# Patient Record
Sex: FEMALE | Race: WHITE | Employment: PART TIME | URBAN - METROPOLITAN AREA
[De-identification: names, ages, dates, MRNs, and addresses within clinical notes are randomized per-mention and may not be internally consistent; named-entity substitution may affect disease eponyms.]

---

## 2017-12-18 PROBLEM — R87.810 PAPANICOLAOU SMEAR OF CERVIX WITH POSITIVE HIGH RISK HUMAN PAPILLOMA VIRUS (HPV) TEST: Status: ACTIVE | Noted: 2017-12-18

## 2018-01-08 ENCOUNTER — HOSPITAL ENCOUNTER (OUTPATIENT)
Dept: MAMMOGRAPHY | Age: 43
Discharge: HOME | End: 2018-01-08
Attending: NURSE PRACTITIONER
Payer: OTHER GOVERNMENT

## 2018-01-08 DIAGNOSIS — Z01.419 WELL WOMAN EXAM: ICD-10-CM

## 2018-01-08 DIAGNOSIS — Z12.31 SCREENING MAMMOGRAM, ENCOUNTER FOR: ICD-10-CM

## 2018-01-08 PROCEDURE — 77067 SCR MAMMO BI INCL CAD: CPT

## 2019-01-18 ENCOUNTER — HOSPITAL ENCOUNTER (OUTPATIENT)
Dept: MAMMOGRAPHY | Age: 44
Discharge: HOME | End: 2019-01-18
Attending: OBSTETRICS & GYNECOLOGY
Payer: OTHER GOVERNMENT

## 2019-01-18 DIAGNOSIS — Z12.39 SCREENING BREAST EXAMINATION: ICD-10-CM

## 2019-01-18 PROCEDURE — 77067 SCR MAMMO BI INCL CAD: CPT

## 2019-03-18 ENCOUNTER — APPOINTMENT (RX ONLY)
Dept: URBAN - METROPOLITAN AREA CLINIC 23 | Facility: CLINIC | Age: 44
Setting detail: DERMATOLOGY
End: 2019-03-18

## 2019-03-18 DIAGNOSIS — Z71.89 OTHER SPECIFIED COUNSELING: ICD-10-CM

## 2019-03-18 DIAGNOSIS — L73.8 OTHER SPECIFIED FOLLICULAR DISORDERS: ICD-10-CM

## 2019-03-18 DIAGNOSIS — L57.8 OTHER SKIN CHANGES DUE TO CHRONIC EXPOSURE TO NONIONIZING RADIATION: ICD-10-CM

## 2019-03-18 PROBLEM — L20.84 INTRINSIC (ALLERGIC) ECZEMA: Status: ACTIVE | Noted: 2019-03-18

## 2019-03-18 PROBLEM — J30.1 ALLERGIC RHINITIS DUE TO POLLEN: Status: ACTIVE | Noted: 2019-03-18

## 2019-03-18 PROBLEM — L85.3 XEROSIS CUTIS: Status: ACTIVE | Noted: 2019-03-18

## 2019-03-18 PROBLEM — D48.5 NEOPLASM OF UNCERTAIN BEHAVIOR OF SKIN: Status: ACTIVE | Noted: 2019-03-18

## 2019-03-18 PROCEDURE — 99242 OFF/OP CONSLTJ NEW/EST SF 20: CPT

## 2019-03-18 PROCEDURE — ? COUNSELING

## 2019-03-18 ASSESSMENT — LOCATION DETAILED DESCRIPTION DERM
LOCATION DETAILED: RIGHT INFERIOR ANTERIOR NECK
LOCATION DETAILED: RIGHT INFERIOR CENTRAL MALAR CHEEK
LOCATION DETAILED: PERIANAL SKIN

## 2019-03-18 ASSESSMENT — LOCATION ZONE DERM
LOCATION ZONE: NECK
LOCATION ZONE: ANUS
LOCATION ZONE: FACE

## 2019-03-18 ASSESSMENT — LOCATION SIMPLE DESCRIPTION DERM
LOCATION SIMPLE: PERIANAL SKIN
LOCATION SIMPLE: RIGHT CHEEK
LOCATION SIMPLE: RIGHT ANTERIOR NECK

## 2019-03-18 NOTE — PROCEDURE: COUNSELING
Patient Specific Counseling (Will Not Stick From Patient To Patient): Consider evaluation by GI
Detail Level: Detailed

## 2020-03-03 ENCOUNTER — HOSPITAL ENCOUNTER (OUTPATIENT)
Dept: MAMMOGRAPHY | Age: 45
Discharge: HOME | End: 2020-03-03
Attending: FAMILY MEDICINE
Payer: OTHER GOVERNMENT

## 2020-03-03 DIAGNOSIS — Z12.31 VISIT FOR SCREENING MAMMOGRAM: ICD-10-CM

## 2020-03-03 PROCEDURE — 77063 BREAST TOMOSYNTHESIS BI: CPT

## 2020-03-04 NOTE — THERAPY EVALUATION
Mer Mckeon  : 1975  [facility]  Secondary:  [facility] [place] [address]. [address], [address]  [phone]   [phone]      OUTPATIENT PHYSICAL THERAPY:Initial Evaluation3/2020    ICD-10: Treatment Diagnosis:   Cervicalgia (M54.2)  Headache (R51)  Muscle Weakness (generalized) (M62.81)  Pain in Left Shoulder (M25.512)  Other muscle spasm (M06.110)  Precautions/Allergies:   Brethine [terbutaline] and Other plant, animal, environmental   Fall Risk Score: 0 (? 5 = High Risk)  MD Orders: Eval and Treat MEDICAL/REFERRING DIAGNOSIS:  Neck pain on left side [M54.2]   DATE OF ONSET: 8-9 years ago  REFERRING PHYSICIAN: Makayla Pichardo*  RETURN PHYSICIAN APPOINTMENT: TBD by patient      INITIAL ASSESSMENT:  Ms. Mer Mckeon has attended 1 physical therapy session including initial evaluation. Mer Mckeon presents with raised 1st rib on L side, trigger points throughout L upper trap and cervical musculature on the L, hypomobile lower cervical spine and overall S/S of increased pain, decreased ROM, decreased strength, decreased functional tolerance consistent with S/S of cervicalgia, muscle spasm muscle weakness, and L shoulder pain and headaches. Mer Mckeon will benefit from home exercise program, therapeutic and postural strengthening exercises, manual therapeutic techniques (ie. Distraction, SOR, myofascial release/soft tissue mobilization) as appropriate to address Deshawn Chambers's current condition. Mer Mckeon will benefit from skilled PT (medically necessary) to address above deficits affecting participation in basic ADLs and overall functional tolerance. PROBLEM LIST (Impacting functional limitations):  1. Decreased Strength  2. Decreased ADL/Functional Activities  3. Decreased Transfer Abilities  4. Increased Pain  5.  Decreased Activity Tolerance  6. Increased Fatigue  7. Increased Shortness of Breath  8. Decreased Flexibility/Joint Mobility  9. Decreased [place] with Home Exercise Program INTERVENTIONS PLANNED:  1. Balance Exercise  2. Bed Mobility  3. Cold  4. Cryotherapy  5. Family Education  6. Gait Training  7. Heat  8. Home Exercise Program (HEP)  9. Manual Therapy  10. Neuromuscular Re-education/Strengthening  11. Range of Motion (ROM)  12. Therapeutic Activites  13. Therapeutic Exercise/Strengthening  14. Transfer Training   TREATMENT PLAN:  Effective Dates: 3/5/2020 TO 6/3/2020 (90 days). Frequency/Duration: 1-2 time a week for 90 Days  GOALS: (Goals have been discussed and agreed upon with patient.)     Short-Term Goals~4 weeks  Goal Met   1. Marcos Causey will report <=3/10 pain to cervical spine with sitting at her desk during work  1. [] Date:   2. Marcos Causey will demonstrate improved NDI score, indicating spinal improvement from 8/50 to 2/50 affecting minimal to no difficulty with performance of cervical mobility and strengthening. 2.  [] Date:   3. Marcos Cue to be independent with initial HEP for cervical region and UE's. 3.  [] Date:   4. Marcos Causey will improve cervical L SB ROM to >=50 degrees to assist with improved function during instrumental activities of daily living. 4.  [] Date:   5.  5.  [] Date:     6.  [] Date:         Long Term Goals~8 weeks Goal Met   1. Marcos Causey will report <=1-2/10 pain to cervical spine with performance of functional spinal mobility and rotation and minimal to no difficulty with such tasks. 1.  [] Date:   2. Marcos Causey will report min to no pain with her exercise videos to improve health and quality of life 2. [] Date:   3. Marcos Causey to be independent with advanced HEP for cervical region and UE's. 3.  [] Date:               Outcome Measure:    Tool Used: Neck Disability Index (NDI)  Score:  Initial: 8/50  Most Recent: X/50 (Date: -- )   Interpretation of Score: The Neck Disability Index is a revised form of the Oswestry Low Back Pain Index and is designed to measure the activities of daily living in person's with neck pain. Each section is scored on a 0-5 scale, 5 representing the greatest disability. The scores of each section are added together for a total score of 50. Medical Necessity:   · Skilled intervention continues to be required due to address above deficits affecting participation in basic ADLs and overall functional tolerance. Reason for Services/Other Comments:  · Patient continues to require skilled intervention due to address above deficits affecting participation in basic ADLs and overall functional tolerance. Total Treatment Duration:10 eval, 20 manual, 15 therex  PT Patient Time In/Time Out  Time In: 1500  Time Out: 1545      Rehabilitation Potential For Stated Goals: [facility] therapy, I certify that the treatment plan above will be carried out by a therapist or under their direction. Thank you for this referral,  Obed Mazariegos     Referring Physician Signature: Juliann Le*              Date                    HISTORY:   History of Present Injury/Illness (Reason for Referral  Ronald Sepulveda states she has had L sided neck pain for about 8-9 years due to carrying stress in her shoulders. She states it doesn't bother her daily, but more so when she is stressed out. She states she stopped working out about 3 weeks ago and took a rest due to pain with lifting on L elbow/arm.  She states she was doing RUBEN and has recently started back working out with the program.     Location:   Pain 1(P1): L superior shoulder/neck and elbow   Pain 2(P2):     Pain Severity:  Current:0/10  Best: 0/10  Worst: 7/10    · Aggravating factors: Lifting and flexing or stretching, and stress  · Relieving factors: massage and not being stressed  · Irritability: Low (Onset of pain is is longer than the time it takes for Pain to go away)      Past Medical History/Comorbidities:   Ms. Pattie Gold  has a past medical history of ASCUS with positive high risk HPV cervical (06/20/2018), Hepatitis A, HPV (human papilloma virus) infection (2017, 2018 and 2019), and Miscarriage. Ms. Pattie Gold  has a past surgical history that includes hx dilation and curettage; hx abdominal laparoscopy; hx colposcopy (02/18/2017); and hx wisdom teeth extraction (1994). Active Ambulatory Problems     Diagnosis Date Noted    Papanicolaou smear of cervix with positive high risk human papilloma virus (HPV) test 12/18/2017     Resolved Ambulatory Problems     Diagnosis Date Noted    No Resolved Ambulatory Problems     Past Medical History:   Diagnosis Date    ASCUS with positive high risk HPV cervical 06/20/2018    Hepatitis A     HPV (human papilloma virus) infection 2017, 2018 and 2019    Miscarriage      Social History/Living Environment:        Social History     Socioeconomic History    Marital status:      Spouse name: Not on file    Number of children: Not on file    Years of education: Not on file    Highest education level: Not on file   Occupational History    Not on file   Social Needs    Financial resource strain: Not on file    Food insecurity:     Worry: Not on file     Inability: Not on file    Transportation needs:     Medical: Not on file     Non-medical: Not on file   Tobacco Use    Smoking status: Never Smoker    Smokeless tobacco: Never Used   Substance and Sexual Activity    Alcohol use:  Yes     Alcohol/week: 1.0 standard drinks     Types: 1 Glasses of wine per week     Comment: OCC    Drug use: No    Sexual activity: Yes     Partners: Male     Birth control/protection: Inserts     Comment: NuvaRing   Lifestyle    Physical activity:     Days per week: Not on file     Minutes per session: Not on file    Stress: Not on file   Relationships    Social connections:     Talks on phone: Not on file     Gets together: Not on file     Attends Yazdanism service: Not on file     Active member of club or organization: Not on file     Attends meetings of clubs or organizations: Not on file     Relationship status: Not on file    Intimate partner violence:     Fear of current or ex partner: Not on file     Emotionally abused: Not on file     Physically abused: Not on file     Forced sexual activity: Not on file   Other Topics Concern    Not on file   Social History Narrative    Not on file      Prior Level of Function/Work/Activity:  Unrestricted    Other Clinical Tests:          none    Previous Treatment Approaches:          Massage  Current Medications:    Current Outpatient Medications:     ethinyl estradiol-etonogestrel (NUVARING) 0.12-0.015 mg/24 hr vaginal ring, Insert 1 ring per vagina and leave in place for 3 weeks. Remove for 1 week., Disp: 3 Each, Rfl: 4    montelukast (SINGULAIR) 10 mg tablet, Take 1 Tab by mouth daily. , Disp: 30 Tab, Rfl: 11    FEXOFENADINE HCL (ALLEGRA PO), Take  by mouth., Disp: , Rfl:         Ambulatory/Rehab Services H2 Model Falls Risk Assessment    Risk Factors:       No Risk Factors Identified Ability to Rise from Chair:       (0)  Ability to rise in a single movement    Falls Prevention Plan:       No modifications necessary   Total: (5 or greater = High Risk): 0    ©2010 [facility] of [employer]. All Rights Reserved. [facility] Patent #[id number]. Federal Law prohibits the replication, distribution or use without written permission from [facility] [employer]         Date Last Reviewed:  3/5/2020     1-2: MODERATE COMPLEXITY   EXAMINATION:   Observation/Orthostatic Postural Assessment:           Forward head and L UT/shoulder raised significantly higher than R  Palpation:          TTP L UT and cervical/shoulder musculature  ROM:      AROM/PROM                  Joint: Eval Date:3/5/20  Re-Assess Date:  Re-Assess Date:    Active ROM           Cervical Extension 62          Cervical Flexion 75           RIGHT LEFT RIGHT LEFT RIGHT LEFT   Cervical Sidebending 56 45           Cervical Rotation WFL WFL                        Shoulder Flexion WFL            Shoulder Abduction WFL                         Lumbar Flexion             Lumber Extension                   Strength:     Eval Date:3/5/20  Re-Assess Date:  Re-Assess Date:      RIGHT LEFT RIGHT LEFT RIGHT LEFT   Shoulder Flexion  5/5  5/5           Shoulder Abduction (C5)  5/5  5/5           Shoulder Internal Rotation  5/5  5/5           Shoulder External Rotation  5/5  5/5           Elbow Flexion (C6)  5/5  5/5           Elbow Extension (C7)  5/5  5/5           Wrist Flexion (C7)  5/5  5/5           Wrist Extension (C6)  5/5  5/5           Resisted Thumb Extension/Finger Abduction (C8/T1) Bree     Normal           Resisted Cervical Rotation (C1):             Resisted Shoulder Shrug (C2, 3, 4): normal              Strength     [facility] Bowels                     Manual:  Joint Directon Grade Treatment Effect    PA III Improved Symptoms post treatment    PA III Improved Symptoms post treatment    Inferior Glide IV Improved Symptoms post treatment     Reflex Testing (Biceps, Brachioradialis, Triceps): Not tested          Special Tests:    Spurling's:Negative                Patient denies Dysarthria, , Diplopia, Drop attacks, , Dizziness or Dysphagia      Neurological Screen: Radiating symptoms: NO     Functional Mobility:  Limited     Balance:  Not Tested     Body Structures Involved:  1. Nerves  2. Joints  3. Muscles  4. Ligaments Body Functions Affected:  1. Sensory/Pain  2. Neuromusculoskeletal  3. Movement Related Activities and Participation Affected:  1. Mobility  2.  Self Care     Number of elements that affect the Plan of Care: 1-2: LOW COMPLEXITY   CLINICAL PRESENTATION:   Presentation: Stable and uncomplicated: LOW COMPLEXITY CLINICAL DECISION MAKING:      Use of outcome tool(s) and clinical judgement create a POC that gives a: Clear prediction of patient's progress: LOW COMPLEXITY   See treatment note for associated treatment provided today.       Future Appointments   Date Time Provider Whitney Hutchinson   3/9/2020 11:00 AM [facility]   3/16/2020  8:45 AM Cristina SCHULTZ [facility]   3/20/2020  9:30 AM Cristina SCHULTZ [facility]   3/23/2020  4:00 PM [facility]   3/30/2020  9:30 AM [facility]   1/25/2021  8:45 AM Yojana Catalan MD [facility]         Rozina Krause

## 2020-03-04 NOTE — PROGRESS NOTES
Talita Berrios  : 1975  Payor: Feng Donnelly / Plan: Hugh Myles 75 / Product Type:  /  [address] [facility], [address], [address] [place] [address]  [phone]   [phone]                                                          Clementina Fabry*      OUTPATIENT PHYSICAL THERAPY: Daily Treatment Note 3/5/2020 Visit Count:  1    Tx Diagnosis   ICD-10: Treatment Diagnosis:   Cervicalgia (M54.2)  Headache (R51)  Muscle Weakness (generalized) (M62.81)  Pain in Left Shoulder (M25.512)  Other muscle spasm (O11.155)  Precautions/Allergies:   Brethine [terbutaline] and Other plant, animal, environmental   Fall Risk Score: 0 (? 5 = High Risk)  MD Orders: Eval and Treat MEDICAL/REFERRING DIAGNOSIS:  Neck pain on left side [M54.2]   DATE OF ONSET: 8-9 years ago  REFERRING PHYSICIAN: Adelaide Dutton*  RETURN PHYSICIAN APPOINTMENT: TBD by patient        Pre-treatment Symptoms/Complaints:  No pain today but tension in L shoulder/neck    Pain: Initial:   0 Post Session:  0/10   Medications Last Reviewed:  3/5/2020     Updated Objective Findings:  See initial Evaluation for initial objective measures. Raised 1st rib on L  Trp in L UT and scalenes  Hypomobile lower cervical spine   TREATMENT:   THERAPEUTIC EXERCISE: (15 minutes):  Exercises per grid below to improve mobility, strength and balance. Required minimal visual, verbal and manual cues to promote proper body alignment and promote proper body posture. Progressed resistance and complexity of movement as indicated.      Date:  3/5/20 Date:   Date:     Activity/Exercise Parameters Parameters Parameters   Education HEP, POC, PT goals, anatomy/pathology     UT stretch 3 mins     1st rib self mob 3 mins     Scap retract with ER 10x5\" red     Prone \"I\" 10x5\"                       MANUAL THERAPY: (20 minutes): Joint mobilization, Soft tissue mobilization was utilized and necessary because of the patient's restricted joint motion and restricted motion of soft tissue mobility.    Technique Used Grade  Level # Time(s) Effect while being performed   Thoracic joint mobilization-P/A III thoracic 3 Increase mid thoracic mobility   Joint P/A III C/T junction and C6-5 6 Reduced tension and improved mobility   L UT release  UT L 3 Decreased TrP   Inf glides/depression III/IV  L 1st Rib 6 With breathing, reduced tension   Suboccipital release   2 Decrease tension                        [employer] Portal  Treatment/Session Summary:    · Response to Treatment:  Pt demonstrated understanding of POC and initial HEP. No increase in pain or adverse reactions. Decreased tension in neck leaving  · Communication/Consultation:  POC, HEP, PT goals, Faxed initial evaluation to MD  · Equipment provided today:  HEP handout and red band    Recommendations/Intent for next treatment session: Next visit will focus on manual interventions as indicated and postural strengthening/ROM. Treatment Plan of Care Effective Dates: 3/5/20 TO 6/3/2020 (90 days).   Frequency/Duration: 1-2 times a week for 90 Days      Total Treatment Billable Duration:  45 Rx; 10 eval, 15 therex, 20 manual  PT Patient Time In/Time Out  Time In: 1500  Time Out: [address] [facility] Appointments   Date Time Provider Whitney Hutchinson   1/25/2021  8:45 AM Ariel Schmitt MD [facility]

## 2020-03-05 ENCOUNTER — HOSPITAL ENCOUNTER (OUTPATIENT)
Dept: PHYSICAL THERAPY | Age: 45
Discharge: HOME | End: 2020-03-05
Payer: OTHER GOVERNMENT

## 2020-03-05 DIAGNOSIS — M54.2 NECK PAIN ON LEFT SIDE: ICD-10-CM

## 2020-03-05 PROCEDURE — 97110 THERAPEUTIC EXERCISES: CPT

## 2020-03-05 PROCEDURE — 97140 MANUAL THERAPY 1/> REGIONS: CPT

## 2020-03-05 PROCEDURE — 97161 PT EVAL LOW COMPLEX 20 MIN: CPT

## 2020-03-09 ENCOUNTER — HOSPITAL ENCOUNTER (OUTPATIENT)
Dept: PHYSICAL THERAPY | Age: 45
Discharge: HOME | End: 2020-03-09
Payer: OTHER GOVERNMENT

## 2020-03-09 PROCEDURE — 97140 MANUAL THERAPY 1/> REGIONS: CPT

## 2020-03-09 PROCEDURE — 97110 THERAPEUTIC EXERCISES: CPT

## 2020-03-09 NOTE — PROGRESS NOTES
Lili   : 1975  Payor: Justyna Moore / Plan: Hugh Myles 75 / Product Type:  /  [address]. [facility]., [address], [address] [place] [address]  [phone]   [phone]                                                          Lucian Judge*      OUTPATIENT PHYSICAL THERAPY: Daily Treatment Note 3/9/2020 Visit Count:  2    Tx Diagnosis   ICD-10: Treatment Diagnosis:   Cervicalgia (M54.2)  Headache (R51)  Muscle Weakness (generalized) (M62.81)  Pain in Left Shoulder (M25.512)  Other muscle spasm (F93.060)  Precautions/Allergies:   Brethine [terbutaline] and Other plant, animal, environmental   Fall Risk Score: 0 (? 5 = High Risk)  MD Orders: Eval and Treat MEDICAL/REFERRING DIAGNOSIS:  Neck pain on left side [M54.2]   DATE OF ONSET: 8-9 years ago  REFERRING PHYSICIAN: Adelaide Núñez*  RETURN PHYSICIAN APPOINTMENT: TBD by patient        Pre-treatment Symptoms/Complaints:  Exercises really helping! Pain: Initial:   3 Post Session:  2/10   Medications Last Reviewed:  3/9/2020     Updated Objective Findings:  See initial Evaluation for initial objective measures. Raised 1st rib on L  Trp in L UT and scalenes  Hypomobile lower cervical spine   TREATMENT:   THERAPEUTIC EXERCISE: (10 minutes):  Exercises per grid below to improve mobility, strength and balance. Required minimal visual, verbal and manual cues to promote proper body alignment and promote proper body posture. Progressed resistance and complexity of movement as indicated.      Date:  3/5/20 Date:  3/9/20 Date:     Activity/Exercise Parameters Parameters Parameters   Education HEP, POC, PT goals, anatomy/pathology TDN education and self after care    UT stretch 3 mins 3 mins    1st rib self mob 3 mins     Scap retract with ER 10x5\" red     Prone \"I\" 10x5\"     UBE  3F/3B                MANUAL THERAPY: (30 minutes): Joint mobilization, Soft tissue mobilization was utilized and necessary because of the patient's restricted joint motion and restricted motion of soft tissue mobility.    Technique Used Grade  Level # Time(s) Effect while being performed   Thoracic joint mobilization-P/A III thoracic 4 Increase mid thoracic mobility   Joint P/A III C/T junction and C6-5 3 Reduced tension and improved mobility   L UT release  UT L 3 Decreased TrP   Inf glides/depression III/IV  L 1st Rib 3 With breathing, reduced tension   Suboccipital release   2 Decrease tension   TDN  L UT 15 Decreased tension and Trp in L UT. Decrease pain                                  Dry Needles Used: 2   Dry Needles Removed: 2           [employer] Portal  Treatment/Session Summary:    Response to Treatment:  Pt responds very well to manual interventions today. No increase in pain or adverse reactions. Decreased tension in neck leaving. TDN performed with verbal and written consent. Patient understands procedure and verbalizes agreement. No adverse side effects noted following TDN. ·   · Communication/Consultation:  POC, HEP, PT goals, Faxed initial evaluation to MD  · Equipment provided today:  HEP handout    Recommendations/Intent for next treatment session: Next visit will focus on manual interventions as indicated and postural strengthening/ROM. Treatment Plan of Care Effective Dates: 3/5/20 TO 6/3/2020 (90 days).   Frequency/Duration: 1-2 times a week for 90 Days      Total Treatment Billable Duration:  40  PT Patient Time In/Time Out  Time In: 1013  Time Out: [address]    Future Appointments   Date Time Provider Whitney Hutchinson   3/16/2020  8:45 AM Patricia Valentin [facility]   3/20/2020  9:30 AM Norm Magaly N [facility]   3/23/2020  4:00 PM Norm Magaly N [facility]   3/30/2020  9:30 AM Norm Magaly N [facility]   1/25/2021  8:45 AM Elijah Beyer MD [facility]

## 2020-03-16 ENCOUNTER — HOSPITAL ENCOUNTER (OUTPATIENT)
Dept: PHYSICAL THERAPY | Age: 45
Discharge: HOME | End: 2020-03-16
Payer: OTHER GOVERNMENT

## 2020-03-16 PROCEDURE — 97140 MANUAL THERAPY 1/> REGIONS: CPT

## 2020-03-16 PROCEDURE — 97110 THERAPEUTIC EXERCISES: CPT

## 2020-03-16 NOTE — PROGRESS NOTES
Rhianna Hernandez  : 1975  Payor: Herber Rudd / Plan: Hugh Myles 75 / Product Type:  /  [address] [place] [address] at [address] [place] [address]. [facility], [address], [address] [place] [address]  [phone]   [phone]                                                          Sharyle Mosher*      OUTPATIENT PHYSICAL THERAPY: Daily Treatment Note 3/16/2020 Visit Count:  3    Tx Diagnosis   ICD-10: Treatment Diagnosis:   Cervicalgia (M54.2)  Headache (R51)  Muscle Weakness (generalized) (M62.81)  Pain in Left Shoulder (M25.512)  Other muscle spasm (P32.083)  Precautions/Allergies:   Brethine [terbutaline] and Other plant, animal, environmental   Fall Risk Score: 0 (? 5 = High Risk)  MD Orders: Eval and Treat MEDICAL/REFERRING DIAGNOSIS:  Neck pain on left side [M54.2]   DATE OF ONSET: 8-9 years ago  REFERRING PHYSICIAN: Adelaide Alexander*  RETURN PHYSICIAN APPOINTMENT: TBD by patient        Pre-treatment Symptoms/Complaints:  Sore and tighter this week     Pain: Initial:   3 Post Session:  2/10   Medications Last Reviewed:  3/16/2020     Updated Objective Findings:  See initial Evaluation for initial objective measures. Raised 1st rib on L  Trp in L UT and scalenes  Hypomobile lower cervical spine   TREATMENT:   THERAPEUTIC EXERCISE: (23 minutes):  Exercises per grid below to improve mobility, strength and balance. Required minimal visual, verbal and manual cues to promote proper body alignment and promote proper body posture. Progressed resistance and complexity of movement as indicated.      Date:  3/5/20 Date:  3/9/20 Date:  3/16/20   Activity/Exercise Parameters Parameters Parameters   Education HEP, POC, PT goals, anatomy/pathology TDN education and self after care    UT stretch 3 mins 3 mins    1st rib self mob 3 mins     Scap retract with ER 10x5\" red     Prone \"I\" 10x5\"  10x5\" I and T   UBE  3F/3B 3F/3B level 4   Chin tuck supine and standing with ball 10x5\" ea way   Shoulder ext   20x5\"   Prone scap protraction and chin tuck on elbows   10x5\"   shrugs   2x10 4#               MANUAL THERAPY: (30 minutes): Joint mobilization, Soft tissue mobilization was utilized and necessary because of the patient's restricted joint motion and restricted motion of soft tissue mobility.    Technique Used Grade  Level # Time(s) Effect while being performed   Thoracic joint mobilization-P/A III thoracic 0 Increase mid thoracic mobility   Joint P/A III C/T junction and C6-5 4 Reduced tension and improved mobility   L UT release  UT L 5 Decreased TrP   Inf glides/depression III/IV  L 1st Rib 5 With breathing, reduced tension   Suboccipital release   4 Decrease tension   TDN  L UT 15 Decreased tension and Trp in L UT. Decrease pain                                  Dry Needles Used: 2   Dry Needles Removed: 2           [employer] Portal  Treatment/Session Summary:    Response to Treatment:  Pt responds very well to manual interventions today. No increase in pain or adverse reactions. Decreased tension in neck leaving. TDN performed with verbal and written consent. Patient understands procedure and verbalizes agreement. No adverse side effects noted following TDN. Pt progressed very well with postural strengthening today  ·   · Communication/Consultation:  POC, HEP, PT goals, Faxed initial evaluation to MD  · Equipment provided today:  HEP handout-prone exercises, shrugs, chin tucks    Recommendations/Intent for next treatment session: Next visit will focus on manual interventions as indicated and postural strengthening/ROM. Treatment Plan of Care Effective Dates: 3/5/20 TO 6/3/2020 (90 days).   Frequency/Duration: 1-2 times a week for 90 Days      Total Treatment Billable Duration:  53  PT Patient Time In/Time Out  Time In: 0695  Time Out: [address]    Future Appointments   Date Time Provider Whitney Hutchinson   3/20/2020  9:30 AM [facility] 3/23/2020  4:00 PM Uriel SCHULTZ [facility]   3/30/2020  9:30 AM Uriel SCHULTZ [facility]   4/20/2020  1:30 PM Gwendolyn Olmos DO [facility]   4/20/2020  1:30 PM [facility]   1/25/2021  8:45 AM Joseph Augustine MD [facility]

## 2020-06-16 NOTE — THERAPY DISCHARGE
Lucio Koyanagi  : 1975  Primary: Bria Arias [facility]  Secondary:  [facility] [address]  [facility] [address], [address]  [phone]   [phone]      OUTPATIENT PHYSICAL THERAPY:Discharge Summary2020    ICD-10: Treatment Diagnosis:   Cervicalgia (M54.2)  Headache (R51)  Muscle Weakness (generalized) (M62.81)  Pain in Left Shoulder (M25.512)  Other muscle spasm (Y27.645)  Precautions/Allergies:   Brethine [terbutaline] and Other plant, animal, environmental   Fall Risk Score: 0 (? 5 = High Risk)  MD Orders: Eval and Treat MEDICAL/REFERRING DIAGNOSIS:  Neck pain on left side   DATE OF ONSET: 8-9 years ago  REFERRING PHYSICIAN: Yonas Rawls*  RETURN PHYSICIAN APPOINTMENT: TBD by patient      Lucio Koyanagi has been seen in physical therapy for 3 visits. Treatment has been discontinued at this time due to patient failing to return for additional treatment. The below goals were met prior to discontinuation. Some goals were not met due to pt failed to return to PT. Thank you for this referral.           INITIAL ASSESSMENT:  Ms. Lucio Koyanagi has attended 1 physical therapy session including initial evaluation. Lucio Koyanagi presents with raised 1st rib on L side, trigger points throughout L upper trap and cervical musculature on the L, hypomobile lower cervical spine and overall S/S of increased pain, decreased ROM, decreased strength, decreased functional tolerance consistent with S/S of cervicalgia, muscle spasm muscle weakness, and L shoulder pain and headaches. Lucio Koyanagi will benefit from home exercise program, therapeutic and postural strengthening exercises, manual therapeutic techniques (ie. Distraction, SOR, myofascial release/soft tissue mobilization) as appropriate to address MATT Marquez Chambers's current condition.          1.  1.    TREATMENT PLAN:  Effective Dates: 2020 TO 6/3/2020 (90 days). Frequency/Duration: 1-2 time a week for 90 Days  GOALS: (Goals have been discussed and agreed upon with patient.)     Short-Term Goals~4 weeks  Goal Met   1. Radha Clark will report <=3/10 pain to cervical spine with sitting at her desk during work  1. [] Date:   2. Radha Clark will demonstrate improved NDI score, indicating spinal improvement from 8/50 to 2/50 affecting minimal to no difficulty with performance of cervical mobility and strengthening. 2.  [] Date:   3. Radha Clark to be independent with initial HEP for cervical region and UE's. 3.  [] Date:   4. Radha Clark will improve cervical L SB ROM to >=50 degrees to assist with improved function during instrumental activities of daily living. 4.  [] Date:   5.  5.  [] Date:     6.  [] Date:         Long Term Goals~8 weeks Goal Met   1. Radha Clark will report <=1-2/10 pain to cervical spine with performance of functional spinal mobility and rotation and minimal to no difficulty with such tasks. 1.  [] Date:   2. Radha Clark will report min to no pain with her exercise videos to improve health and quality of life 2. [] Date:   3. Radha Clark to be independent with advanced HEP for cervical region and UE's. 3.  [] Date:               Outcome Measure: Tool Used: Neck Disability Index (NDI)  Score:  Initial: 8/50  Most Recent: X/50 (Date: -- )   Interpretation of Score: The Neck Disability Index is a revised form of the Oswestry Low Back Pain Index and is designed to measure the activities of daily living in person's with neck pain. Each section is scored on a 0-5 scale, 5 representing the greatest disability. The scores of each section are added together for a total score of 50.    Rozina Garcias               Date                                    1. 1. 1.

## 2021-03-05 ENCOUNTER — HOSPITAL ENCOUNTER (OUTPATIENT)
Dept: MAMMOGRAPHY | Age: 46
Discharge: HOME | End: 2021-03-05
Attending: FAMILY MEDICINE
Payer: OTHER GOVERNMENT

## 2021-03-05 DIAGNOSIS — Z12.31 ENCOUNTER FOR SCREENING MAMMOGRAM FOR MALIGNANT NEOPLASM OF BREAST: ICD-10-CM

## 2021-03-05 PROCEDURE — 77067 SCR MAMMO BI INCL CAD: CPT

## 2022-02-21 ENCOUNTER — TRANSCRIBE ORDER (OUTPATIENT)
Dept: SCHEDULING | Age: 47
End: 2022-02-21

## 2022-02-21 DIAGNOSIS — Z12.31 VISIT FOR SCREENING MAMMOGRAM: Primary | ICD-10-CM

## 2022-03-07 ENCOUNTER — HOSPITAL ENCOUNTER (OUTPATIENT)
Dept: MAMMOGRAPHY | Age: 47
Discharge: HOME | End: 2022-03-07
Attending: FAMILY MEDICINE
Payer: OTHER GOVERNMENT

## 2022-03-07 DIAGNOSIS — Z12.31 VISIT FOR SCREENING MAMMOGRAM: ICD-10-CM

## 2022-03-07 PROCEDURE — 77067 SCR MAMMO BI INCL CAD: CPT

## 2022-03-18 PROBLEM — R87.810 PAPANICOLAOU SMEAR OF CERVIX WITH POSITIVE HIGH RISK HUMAN PAPILLOMA VIRUS (HPV) TEST: Status: ACTIVE | Noted: 2017-12-18

## 2022-07-12 RX ORDER — ETONOGESTREL AND ETHINYL ESTRADIOL 11.7; 2.7 MG/1; MG/1
INSERT, EXTENDED RELEASE VAGINAL
Refills: 3 | OUTPATIENT
Start: 2022-07-12

## 2022-08-03 NOTE — PROGRESS NOTES
Birth control/protection: Inserts     Comment: NuvaRing   Other Topics Concern    Not on file   Social History Narrative    Not on file     Social Determinants of Health     Financial Resource Strain: Not on file   Food Insecurity: Not on file   Transportation Needs: Not on file   Physical Activity: Not on file   Stress: Not on file   Social Connections: Not on file   Intimate Partner Violence: Not on file   Housing Stability: Not on file       Family History:  Family History   Problem Relation Age of Onset    Hypertension Mother     Rheum Arthritis Mother     No Known Problems Father     Breast Cancer Neg Hx     Alzheimer's Disease Maternal Grandfather     Suicide Paternal Grandmother     Heart Disease Paternal Grandfather     Alzheimer's Disease Maternal Grandmother        Review of Systems - General ROS: negative except for that discussed in HPI      ROS:  Feeling well. No dyspnea or chest pain on exertion. No abdominal pain, change in bowel habits, black or bloody stools. No urinary tract symptoms. No neurological complaints. Objective:   /62   Ht 5' 4\" (1.626 m)   Wt 122 lb 12.8 oz (55.7 kg)   BMI 21.08 kg/m²   The patient appears well, alert, oriented x 3, in no distress. ENT normal.  Neck supple. No adenopathy or thyromegaly. Lungs:  clear, good air entry, no wheezes, rhonchi or rales. Heart:  S1 and S2 normal, no murmurs, regular rate and rhythm. Abdomen:  soft without tenderness, guarding, mass or organomegaly. Extremities show no edema, normal peripheral pulses. Neurological is normal, no focal findings.     BREAST EXAM: breasts appear normal, no suspicious masses, no skin or nipple changes or axillary nodes, risk and benefit of breast self-exam was discussed    PELVIC EXAM: VULVA: normal appearing vulva with no masses, tenderness or lesions, VAGINA: normal appearing vagina with normal color and discharge, no lesions, CERVIX: normal appearing cervix without discharge or lesions, UTERUS: uterus is normal size, shape, consistency and nontender, ADNEXA: normal adnexa in size, nontender and no masses    Assessment/Plan:     1. Well woman exam    - AMB POC URINALYSIS DIP STICK AUTO W/O MICRO    2. Screening for genitourinary condition    - AMB POC URINALYSIS DIP STICK AUTO W/O MICRO     pap smear not taken per asccp guidelines  Refer gi for colonoscopy  return annually or prn    Supervising physician is Dr. Edmund Carrillo.  used

## 2022-08-04 ENCOUNTER — OFFICE VISIT (OUTPATIENT)
Dept: OBGYN CLINIC | Age: 47
End: 2022-08-04
Payer: OTHER GOVERNMENT

## 2022-08-04 VITALS
HEIGHT: 64 IN | WEIGHT: 122.8 LBS | DIASTOLIC BLOOD PRESSURE: 62 MMHG | BODY MASS INDEX: 20.96 KG/M2 | SYSTOLIC BLOOD PRESSURE: 118 MMHG

## 2022-08-04 DIAGNOSIS — Z13.89 SCREENING FOR GENITOURINARY CONDITION: ICD-10-CM

## 2022-08-04 DIAGNOSIS — Z01.419 WELL WOMAN EXAM: Primary | ICD-10-CM

## 2022-08-04 DIAGNOSIS — Z12.11 ENCOUNTER FOR SCREENING COLONOSCOPY: ICD-10-CM

## 2022-08-04 LAB
BILIRUBIN, URINE, POC: NEGATIVE
BLOOD URINE, POC: NEGATIVE
GLUCOSE URINE, POC: NEGATIVE
KETONES, URINE, POC: NEGATIVE
LEUKOCYTE ESTERASE, URINE, POC: NORMAL
NITRITE, URINE, POC: NEGATIVE
PH, URINE, POC: 6 (ref 4.6–8)
PROTEIN,URINE, POC: NEGATIVE
SPECIFIC GRAVITY, URINE, POC: 1.02 (ref 1–1.03)
URINALYSIS CLARITY, POC: CLEAR
URINALYSIS COLOR, POC: YELLOW
UROBILINOGEN, POC: NORMAL

## 2022-08-04 PROCEDURE — 99396 PREV VISIT EST AGE 40-64: CPT | Performed by: NURSE PRACTITIONER

## 2022-08-04 PROCEDURE — 81003 URINALYSIS AUTO W/O SCOPE: CPT | Performed by: NURSE PRACTITIONER

## 2022-08-10 RX ORDER — ETONOGESTREL AND ETHINYL ESTRADIOL 11.7; 2.7 MG/1; MG/1
INSERT, EXTENDED RELEASE VAGINAL
Qty: 3 EACH | Refills: 4 | Status: SHIPPED | OUTPATIENT
Start: 2022-08-10

## 2022-08-10 NOTE — TELEPHONE ENCOUNTER
Patient called stating that she needs to have a nuvaring prescription sent to [facility]. She was last seen on 8/4/22 with Louie Edwards for [facility].

## 2022-12-02 ENCOUNTER — TELEMEDICINE (OUTPATIENT)
Dept: FAMILY MEDICINE CLINIC | Facility: CLINIC | Age: 47
End: 2022-12-02
Payer: OTHER GOVERNMENT

## 2022-12-02 DIAGNOSIS — G43.709 CHRONIC MIGRAINE WITHOUT AURA, NOT INTRACTABLE, WITHOUT STATUS MIGRAINOSUS: Primary | ICD-10-CM

## 2022-12-02 PROCEDURE — 99214 OFFICE O/P EST MOD 30 MIN: CPT | Performed by: FAMILY MEDICINE

## 2022-12-02 ASSESSMENT — ENCOUNTER SYMPTOMS
CONSTIPATION: 0
COUGH: 0
DIARRHEA: 0
VOMITING: 0
SHORTNESS OF BREATH: 0

## 2022-12-02 ASSESSMENT — PATIENT HEALTH QUESTIONNAIRE - PHQ9
SUM OF ALL RESPONSES TO PHQ QUESTIONS 1-9: 0
SUM OF ALL RESPONSES TO PHQ9 QUESTIONS 1 & 2: 0
1. LITTLE INTEREST OR PLEASURE IN DOING THINGS: 0
SUM OF ALL RESPONSES TO PHQ QUESTIONS 1-9: 0
SUM OF ALL RESPONSES TO PHQ QUESTIONS 1-9: 0
2. FEELING DOWN, DEPRESSED OR HOPELESS: 0
SUM OF ALL RESPONSES TO PHQ QUESTIONS 1-9: 0

## 2022-12-02 NOTE — PROGRESS NOTES
Pricila Lainez (:  1975) is a Established patient, here for evaluation of the following:    Assessment & Plan   Below is the assessment and plan developed based on review of pertinent history, physical exam, labs, studies, and medications. 1. Chronic migraine without aura, not intractable, without status migrainosus  -     Rimegepant Sulfate 75 MG TBDP; Take 75 mg by mouth daily, Disp-90 tablet, R-1Normal  Coming to get samples today while we work on [address]. No follow-ups on file. Subjective   HPI  Chief Complaint   Patient presents with    Migraine     Getting worse and wants to see about getting medications to take. Has been using OTC with no help. Other     Has been over 10 years since the last Tdap shot. Has yet to have a Colonoscopy done. Migraines increasing n frequency and severity. Worse when she is pmsing and on her period and when there is a pressure change. Definitely more since she had covid. Post dromal symptoms feels like she is in a complete fog. Headaches will start and last for up to 12 hours, then feels weary and lethargic and emotional the following day. Taking generic excedrin migraine for treatment. But it no longer works. Imitrex caused dizziness in the past.  Arnav Arevalo wasn't covered. Has 2-4 episodes a month and 12 days a month affected by migraines. Review of Systems   Constitutional:  Negative for diaphoresis and unexpected weight change. HENT:  Negative for congestion. Eyes:  Negative for visual disturbance. Respiratory:  Negative for cough and shortness of breath. Cardiovascular:  Negative for chest pain. Gastrointestinal:  Negative for constipation, diarrhea and vomiting. Genitourinary:  Negative for dysuria. Neurological:  Positive for headaches. Psychiatric/Behavioral:  Negative for dysphoric mood and sleep disturbance. The patient is not nervous/anxious.          Objective     Physical Exam  [INSTRUCTIONS:  \"[x]\" Indicates a positive item  \"[]\" Indicates a negative item  -- DELETE ALL ITEMS NOT EXAMINED]    Constitutional: [x] Appears well-developed and well-nourished [x] No apparent distress      [] Abnormal -     Mental status: [x] Alert and awake  [x] Oriented to person/place/time [x] Able to follow commands    [] Abnormal -     Eyes:   EOM    [x]  Normal    [] Abnormal -   Sclera  [x]  Normal    [] Abnormal -          Discharge [x]  None visible   [] Abnormal -     HENT: [x] Normocephalic, atraumatic  [] Abnormal -   [x] Mouth/Throat: Mucous membranes are moist    External Ears [x] Normal  [] Abnormal -    Neck: [x] No visualized mass [] Abnormal -     Pulmonary/Chest: [x] Respiratory effort normal   [x] No visualized signs of difficulty breathing or respiratory distress        [] Abnormal -      Musculoskeletal:   [x] Normal gait with no signs of ataxia         [x] Normal range of motion of neck        [] Abnormal -     Neurological:        [x] No Facial Asymmetry (Cranial nerve 7 motor function) (limited exam due to video visit)          [x] No gaze palsy        [] Abnormal -          Skin:        [x] No significant exanthematous lesions or discoloration noted on facial skin         [] Abnormal -            Psychiatric:       [x] Normal Affect [] Abnormal -        [x] No Hallucinations    Other pertinent observable physical exam findings:-       Patient-Reported Vitals 12/2/2022   Patient-Reported Weight 120   Patient-Reported Height 5'4\"   Patient-Reported Temperature 98      Patient-Reported Vitals  Patient-Reported Weight: [address], was evaluated through a synchronous (real-time) audio-video encounter. The patient (or guardian if applicable) is aware that this is a billable service, which includes applicable co-pays. This Virtual Visit was conducted with patient's (and/or legal guardian's) consent.  The visit was conducted pursuant to the emergency declaration under the [address] Emergencies Act, [address] [facility] waiver authority and the Coronavirus Preparedness and Response Supplemental Appropriations Act. Patient identification was verified, and a caregiver was present when appropriate. The patient was located at Home: [address] [facility] [id number].    Provider was located at Home ([facility]): Celia Grant MD

## 2022-12-05 ENCOUNTER — TELEPHONE (OUTPATIENT)
Dept: FAMILY MEDICINE CLINIC | Facility: CLINIC | Age: 47
End: 2022-12-05

## 2022-12-05 DIAGNOSIS — G43.709 CHRONIC MIGRAINE WITHOUT AURA, NOT INTRACTABLE, WITHOUT STATUS MIGRAINOSUS: Primary | ICD-10-CM

## 2022-12-05 NOTE — TELEPHONE ENCOUNTER
Received info From [employer] my [employer] on the patient's Nurtec 75 mg medication statin g that it needed to be approved. They came back stating that it was denied.  It stated that the medication has to be prescribed by or in consultation with a Neurologist.

## 2022-12-07 RX ORDER — UBROGEPANT 100 MG/1
100 TABLET ORAL DAILY PRN
Qty: 16 TABLET | Refills: 2 | Status: SHIPPED | OUTPATIENT
Start: 2022-12-07

## 2022-12-07 NOTE — TELEPHONE ENCOUNTER
Sent in [place] instead. Looks like it is more preferred and the same type of med. Sent to [facility] which is a specialty pharmacy that [address] call to confirm the rx and discuss treatment plan and facilitate coverage and deliver med directly to patient.

## 2022-12-08 ENCOUNTER — PATIENT MESSAGE (OUTPATIENT)
Dept: FAMILY MEDICINE CLINIC | Facility: CLINIC | Age: 47
End: 2022-12-08

## 2022-12-08 NOTE — TELEPHONE ENCOUNTER
From: Claribel Metcalf  To: Dr. Eduardo Rendon  Sent: 12/8/2022 2:39 PM EST  Subject: migraine medicine    Dr. Marie Moe,  I noticed that both the prescriptions you put in for me are not covered under my insurance. Is there another option I need to try before insurance will approve either of these, or will it never be covered? I was visiting my parents a few weeks ago and got a migraine, which my dad also suffers from and he gave me one of his 40 mg Relpax pills. It made me tired, but did stop the migraine. I would be willing to give it a try at this point just to get some relief. Please let me know if this is an option, or what my next steps should be.     Thank you,  Sherl Moritz

## 2023-04-20 ENCOUNTER — HOSPITAL ENCOUNTER (OUTPATIENT)
Dept: MAMMOGRAPHY | Age: 48
Discharge: HOME | End: 2023-04-20
Payer: OTHER GOVERNMENT

## 2023-04-20 DIAGNOSIS — Z12.31 VISIT FOR SCREENING MAMMOGRAM: ICD-10-CM

## 2023-04-20 PROCEDURE — 77063 BREAST TOMOSYNTHESIS BI: CPT

## 2023-04-25 ENCOUNTER — HOSPITAL ENCOUNTER (OUTPATIENT)
Dept: MAMMOGRAPHY | Age: 48
Discharge: HOME | End: 2023-04-28
Payer: OTHER GOVERNMENT

## 2023-04-25 DIAGNOSIS — R92.8 ABNORMAL SCREENING MAMMOGRAM: ICD-10-CM

## 2023-04-25 PROCEDURE — 77065 DX MAMMO INCL CAD UNI: CPT

## 2023-08-28 RX ORDER — ETONOGESTREL AND ETHINYL ESTRADIOL 11.7; 2.7 MG/1; MG/1
INSERT, EXTENDED RELEASE VAGINAL
Refills: 3 | OUTPATIENT
Start: 2023-08-28

## 2023-09-01 RX ORDER — ETONOGESTREL AND ETHINYL ESTRADIOL VAGINAL .015; .12 MG/D; MG/D
RING VAGINAL
Qty: 3 EACH | Refills: 4 | OUTPATIENT
Start: 2023-09-01

## 2023-09-05 RX ORDER — ETONOGESTREL AND ETHINYL ESTRADIOL VAGINAL .015; .12 MG/D; MG/D
RING VAGINAL
Qty: 3 EACH | Refills: 4 | OUTPATIENT
Start: 2023-09-05

## 2024-01-15 ASSESSMENT — PATIENT HEALTH QUESTIONNAIRE - PHQ9
2. FEELING DOWN, DEPRESSED OR HOPELESS: NOT AT ALL
1. LITTLE INTEREST OR PLEASURE IN DOING THINGS: 0
1. LITTLE INTEREST OR PLEASURE IN DOING THINGS: NOT AT ALL
SUM OF ALL RESPONSES TO PHQ QUESTIONS 1-9: 0
SUM OF ALL RESPONSES TO PHQ9 QUESTIONS 1 & 2: 0
SUM OF ALL RESPONSES TO PHQ QUESTIONS 1-9: 0
SUM OF ALL RESPONSES TO PHQ QUESTIONS 1-9: 0
2. FEELING DOWN, DEPRESSED OR HOPELESS: 0
SUM OF ALL RESPONSES TO PHQ QUESTIONS 1-9: 0
SUM OF ALL RESPONSES TO PHQ9 QUESTIONS 1 & 2: 0

## 2024-01-16 ENCOUNTER — OFFICE VISIT (OUTPATIENT)
Dept: FAMILY MEDICINE CLINIC | Facility: CLINIC | Age: 49
End: 2024-01-16
Payer: OTHER GOVERNMENT

## 2024-01-16 VITALS
RESPIRATION RATE: 19 BRPM | SYSTOLIC BLOOD PRESSURE: 109 MMHG | DIASTOLIC BLOOD PRESSURE: 79 MMHG | HEART RATE: 97 BPM | OXYGEN SATURATION: 100 % | WEIGHT: 127 LBS | TEMPERATURE: 98.4 F | BODY MASS INDEX: 21.68 KG/M2 | HEIGHT: 64 IN

## 2024-01-16 DIAGNOSIS — Z12.11 SCREENING FOR COLON CANCER: ICD-10-CM

## 2024-01-16 DIAGNOSIS — Z00.00 ENCOUNTER FOR WELL ADULT EXAM WITHOUT ABNORMAL FINDINGS: Primary | ICD-10-CM

## 2024-01-16 DIAGNOSIS — Z00.00 ENCOUNTER FOR WELL ADULT EXAM WITHOUT ABNORMAL FINDINGS: ICD-10-CM

## 2024-01-16 DIAGNOSIS — G43.109 MIGRAINE WITH AURA AND WITHOUT STATUS MIGRAINOSUS, NOT INTRACTABLE: ICD-10-CM

## 2024-01-16 DIAGNOSIS — N95.1 MENOPAUSAL SYMPTOMS: ICD-10-CM

## 2024-01-16 LAB
ALBUMIN SERPL-MCNC: 3.7 G/DL (ref 3.5–5)
ALBUMIN/GLOB SERPL: 1 (ref 0.4–1.6)
ALP SERPL-CCNC: 60 U/L (ref 50–136)
ALT SERPL-CCNC: 28 U/L (ref 12–65)
ANION GAP SERPL CALC-SCNC: 3 MMOL/L (ref 2–11)
AST SERPL-CCNC: 21 U/L (ref 15–37)
BASOPHILS # BLD: 0.1 K/UL (ref 0–0.2)
BASOPHILS NFR BLD: 1 % (ref 0–2)
BILIRUB SERPL-MCNC: 0.5 MG/DL (ref 0.2–1.1)
BUN SERPL-MCNC: 8 MG/DL (ref 6–23)
CALCIUM SERPL-MCNC: 9.3 MG/DL (ref 8.3–10.4)
CHLORIDE SERPL-SCNC: 107 MMOL/L (ref 103–113)
CHOLEST SERPL-MCNC: 204 MG/DL
CO2 SERPL-SCNC: 28 MMOL/L (ref 21–32)
CREAT SERPL-MCNC: 0.8 MG/DL (ref 0.6–1)
DIFFERENTIAL METHOD BLD: NORMAL
EOSINOPHIL # BLD: 0.2 K/UL (ref 0–0.8)
EOSINOPHIL NFR BLD: 5 % (ref 0.5–7.8)
ERYTHROCYTE [DISTWIDTH] IN BLOOD BY AUTOMATED COUNT: 12.7 % (ref 11.9–14.6)
GLOBULIN SER CALC-MCNC: 3.6 G/DL (ref 2.8–4.5)
GLUCOSE SERPL-MCNC: 94 MG/DL (ref 65–100)
HCT VFR BLD AUTO: 40.2 % (ref 35.8–46.3)
HDLC SERPL-MCNC: 119 MG/DL (ref 40–60)
HDLC SERPL: 1.7
HGB BLD-MCNC: 13.2 G/DL (ref 11.7–15.4)
IMM GRANULOCYTES # BLD AUTO: 0 K/UL (ref 0–0.5)
IMM GRANULOCYTES NFR BLD AUTO: 0 % (ref 0–5)
LDLC SERPL CALC-MCNC: 71 MG/DL
LYMPHOCYTES # BLD: 1.4 K/UL (ref 0.5–4.6)
LYMPHOCYTES NFR BLD: 31 % (ref 13–44)
MCH RBC QN AUTO: 30.7 PG (ref 26.1–32.9)
MCHC RBC AUTO-ENTMCNC: 32.8 G/DL (ref 31.4–35)
MCV RBC AUTO: 93.5 FL (ref 82–102)
MONOCYTES # BLD: 0.4 K/UL (ref 0.1–1.3)
MONOCYTES NFR BLD: 9 % (ref 4–12)
NEUTS SEG # BLD: 2.4 K/UL (ref 1.7–8.2)
NEUTS SEG NFR BLD: 54 % (ref 43–78)
NRBC # BLD: 0 K/UL (ref 0–0.2)
PLATELET # BLD AUTO: 259 K/UL (ref 150–450)
PMV BLD AUTO: 10.8 FL (ref 9.4–12.3)
POTASSIUM SERPL-SCNC: 4.1 MMOL/L (ref 3.5–5.1)
PROT SERPL-MCNC: 7.3 G/DL (ref 6.3–8.2)
RBC # BLD AUTO: 4.3 M/UL (ref 4.05–5.2)
SODIUM SERPL-SCNC: 138 MMOL/L (ref 136–146)
TRIGL SERPL-MCNC: 70 MG/DL (ref 35–150)
VLDLC SERPL CALC-MCNC: 14 MG/DL (ref 6–23)
WBC # BLD AUTO: 4.5 K/UL (ref 4.3–11.1)

## 2024-01-16 PROCEDURE — 99396 PREV VISIT EST AGE 40-64: CPT | Performed by: FAMILY MEDICINE

## 2024-01-16 RX ORDER — ESTERIFIED ESTROGEN AND METHYLTESTOSTERONE .625; 1.25 MG/1; MG/1
TABLET ORAL
Qty: 63 TABLET | Refills: 1 | Status: SHIPPED | OUTPATIENT
Start: 2024-01-16

## 2024-01-16 RX ORDER — PROGESTERONE 100 MG/1
100 CAPSULE ORAL NIGHTLY
Qty: 90 CAPSULE | Refills: 1 | Status: SHIPPED | OUTPATIENT
Start: 2024-01-16

## 2024-01-16 SDOH — ECONOMIC STABILITY: HOUSING INSECURITY
IN THE LAST 12 MONTHS, WAS THERE A TIME WHEN YOU DID NOT HAVE A STEADY PLACE TO SLEEP OR SLEPT IN A SHELTER (INCLUDING NOW)?: NO

## 2024-01-16 SDOH — ECONOMIC STABILITY: INCOME INSECURITY: HOW HARD IS IT FOR YOU TO PAY FOR THE VERY BASICS LIKE FOOD, HOUSING, MEDICAL CARE, AND HEATING?: NOT HARD AT ALL

## 2024-01-16 SDOH — ECONOMIC STABILITY: FOOD INSECURITY: WITHIN THE PAST 12 MONTHS, THE FOOD YOU BOUGHT JUST DIDN'T LAST AND YOU DIDN'T HAVE MONEY TO GET MORE.: NEVER TRUE

## 2024-01-16 SDOH — ECONOMIC STABILITY: FOOD INSECURITY: WITHIN THE PAST 12 MONTHS, YOU WORRIED THAT YOUR FOOD WOULD RUN OUT BEFORE YOU GOT MONEY TO BUY MORE.: NEVER TRUE

## 2024-01-16 ASSESSMENT — ENCOUNTER SYMPTOMS
CONSTIPATION: 0
SHORTNESS OF BREATH: 0
COUGH: 0
VOMITING: 0
DIARRHEA: 0

## 2024-01-16 NOTE — PATIENT INSTRUCTIONS
hands, brush your teeth twice a day, and wear a seat belt in the car.   Where can you learn more?  Go to [email or web address]://[email or web address] [email or web address]/patientEd and enter P072 to learn more about \"Well Visit, Ages 18 to 65: Care Instructions.\"  Current as of: August 6, 2023               Content Version: 13.9  © 0602-9497 [employer]wise, [employer].   Care instructions adapted under license by [employer]. If you have questions about a medical condition or this instruction, always ask your healthcare professional. Healthwise, [employer] disclaims any warranty or liability for your use of this information.

## 2024-01-16 NOTE — PROGRESS NOTES
visit.    Physical Exam    Assessment   Plan   1. Encounter for well adult exam without abnormal findings  -     CBC with Auto Differential; Future  -     Comprehensive Metabolic Panel; Future  -     Lipid Panel; Future  2. Migraine with aura and without status migrainosus, not intractable  -     Ubrogepant 100 MG TABS; Take 100 mg by mouth as needed (Migraines), Disp-16 tablet, R-2Normal  3. Screening for colon cancer  -     [facility]  4. Menopausal symptoms  -     estrogens, conjugated,-methylTESTOSTERone (ESTRATEST HS) 0.625-1.25 MG per tablet; Take 1 tab daily for 3 weeks, then stop for 1 week, then resume., Disp-63 tablet, R-1Normal  -     progesterone (PROMETRIUM) 100 MG CAPS capsule; Take 1 capsule by mouth nightly, Disp-90 capsule, R-1Normal         Personalized Preventive Plan   Current Health Maintenance Status  Immunization History   Administered Date(s) Administered    COVID-19, J&J, (age 18y+), IM, 0.5 mL 03/06/2021    COVID-19, PFIZER Bivalent, DO NOT Dilute, (age 12y+), IM, 30 mcg/0.3 mL 11/30/2021, 10/26/2022    Influenza, FLUCELVAX, (age 6 mo+), MDCK, MDV, 0.5mL 10/26/2022    Influenza, FLUCELVAX, (age 6 mo+), MDCK, PF, 0.5mL 11/11/2019        Health Maintenance   Topic Date Due    Hepatitis B vaccine (1 of 3 - 3-dose series) Never done    HIV screen  Never done    Hepatitis C screen  Never done    DTaP/Tdap/Td vaccine (1 - Tdap) Never done    Colorectal Cancer Screen  Never done    Flu vaccine (1) 08/01/2023    COVID-19 Vaccine (4 - 2023-24 season) 09/01/2023    Depression Screen  01/15/2025    Lipids  01/25/2026    Cervical cancer screen  02/15/2026    Hepatitis A vaccine  Aged Out    Hib vaccine  Aged Out    Polio vaccine  Aged Out    Meningococcal (ACWY) vaccine  Aged Out    Pneumococcal 0-64 years Vaccine  Aged Out     Recommendations for Preventive Services Due: see orders and patient instructions/AVS.    Return in about 3 months (around 4/16/2024) for

## 2024-03-01 ENCOUNTER — TELEPHONE (OUTPATIENT)
Dept: GASTROENTEROLOGY | Age: 49
End: 2024-03-01

## 2024-04-01 ENCOUNTER — TRANSCRIBE ORDERS (OUTPATIENT)
Dept: SCHEDULING | Age: 49
End: 2024-04-01

## 2024-04-01 DIAGNOSIS — Z12.31 SCREENING MAMMOGRAM FOR HIGH-RISK PATIENT: Primary | ICD-10-CM

## 2024-04-30 ENCOUNTER — OFFICE VISIT (OUTPATIENT)
Dept: FAMILY MEDICINE CLINIC | Facility: CLINIC | Age: 49
End: 2024-04-30
Payer: OTHER GOVERNMENT

## 2024-04-30 VITALS
OXYGEN SATURATION: 100 % | WEIGHT: 124 LBS | TEMPERATURE: 97 F | DIASTOLIC BLOOD PRESSURE: 78 MMHG | HEART RATE: 70 BPM | BODY MASS INDEX: 21.17 KG/M2 | HEIGHT: 64 IN | RESPIRATION RATE: 19 BRPM | SYSTOLIC BLOOD PRESSURE: 120 MMHG

## 2024-04-30 DIAGNOSIS — N95.1 MENOPAUSAL SYMPTOMS: ICD-10-CM

## 2024-04-30 DIAGNOSIS — G43.109 MIGRAINE WITH AURA AND WITHOUT STATUS MIGRAINOSUS, NOT INTRACTABLE: Primary | ICD-10-CM

## 2024-04-30 PROCEDURE — 99214 OFFICE O/P EST MOD 30 MIN: CPT | Performed by: FAMILY MEDICINE

## 2024-04-30 PROCEDURE — G2211 COMPLEX E/M VISIT ADD ON: HCPCS | Performed by: FAMILY MEDICINE

## 2024-04-30 ASSESSMENT — ENCOUNTER SYMPTOMS
SHORTNESS OF BREATH: 0
CONSTIPATION: 0
DIARRHEA: 0
COUGH: 0
VOMITING: 0

## 2024-04-30 NOTE — PROGRESS NOTES
Maeve Hodgson (:  1975) is a 48 y.o. female,Established patient, here for evaluation of the following chief complaint(s):  3 Month Follow-Up (On Migraines.)      Assessment & Plan   1. Migraine with aura and without status migrainosus, not intractable  2. Menopausal symptoms  Will refill meds prn. F/u in 6 mo +physical.     No follow-ups on file.       Subjective   HPI  Chief Complaint   Patient presents with    3 Month Follow-Up     On Migraines.   Migraines well controlled on ubrelvy. Doesn't need refills. Ubrelvy works when she gets one.  Getting around 1-2 migraines a month.     She is on estratest and prometrium for hrt and is doing well with this. No side effects.  She did start having her period again. She states it is still irregular. Improvement in sex life, less hot flashes, and less brain fog.       Review of Systems   Constitutional:  Negative for diaphoresis and unexpected weight change.   HENT:  Negative for congestion.    Eyes:  Negative for visual disturbance.   Respiratory:  Negative for cough and shortness of breath.    Cardiovascular:  Negative for chest pain.   Gastrointestinal:  Negative for constipation, diarrhea and vomiting.   Genitourinary:  Negative for dysuria.   Neurological:  Negative for headaches.   Psychiatric/Behavioral:  Negative for dysphoric mood and sleep disturbance. The patient is not nervous/anxious.           Objective   Physical Exam  Vitals reviewed.   Constitutional:       Appearance: Normal appearance.   HENT:      Head: Normocephalic.   Eyes:      Extraocular Movements: Extraocular movements intact.      Conjunctiva/sclera: Conjunctivae normal.      Pupils: Pupils are equal, round, and reactive to light.   Cardiovascular:      Rate and Rhythm: Normal rate and regular rhythm.      Heart sounds: Normal heart sounds. No murmur heard.  Pulmonary:      Effort: Pulmonary effort is normal. No respiratory distress.      Breath sounds: Normal breath sounds.

## 2024-05-06 ENCOUNTER — HOSPITAL ENCOUNTER (OUTPATIENT)
Dept: MAMMOGRAPHY | Age: 49
Discharge: HOME | End: 2024-05-09
Attending: FAMILY MEDICINE
Payer: OTHER GOVERNMENT

## 2024-05-06 DIAGNOSIS — Z12.31 SCREENING MAMMOGRAM FOR HIGH-RISK PATIENT: ICD-10-CM

## 2024-05-06 PROCEDURE — 77063 BREAST TOMOSYNTHESIS BI: CPT

## 2024-07-06 DIAGNOSIS — N95.1 MENOPAUSAL SYMPTOMS: ICD-10-CM

## 2024-07-11 RX ORDER — ESTERIFIED ESTROGEN AND METHYLTESTOSTERONE .625; 1.25 MG/1; MG/1
TABLET ORAL
Qty: 63 TABLET | Refills: 1 | Status: SHIPPED | OUTPATIENT
Start: 2024-07-11

## 2024-07-11 RX ORDER — PROGESTERONE 100 MG/1
100 CAPSULE ORAL NIGHTLY
Qty: 90 CAPSULE | Refills: 1 | Status: SHIPPED | OUTPATIENT
Start: 2024-07-11

## 2024-10-31 ENCOUNTER — OFFICE VISIT (OUTPATIENT)
Dept: FAMILY MEDICINE CLINIC | Facility: CLINIC | Age: 49
End: 2024-10-31

## 2024-10-31 VITALS
OXYGEN SATURATION: 99 % | RESPIRATION RATE: 19 BRPM | SYSTOLIC BLOOD PRESSURE: 102 MMHG | HEART RATE: 85 BPM | TEMPERATURE: 96.7 F | BODY MASS INDEX: 21.34 KG/M2 | HEIGHT: 64 IN | WEIGHT: 125 LBS | DIASTOLIC BLOOD PRESSURE: 78 MMHG

## 2024-10-31 DIAGNOSIS — N95.1 MENOPAUSAL SYMPTOMS: ICD-10-CM

## 2024-10-31 DIAGNOSIS — Z12.11 SCREENING FOR COLON CANCER: ICD-10-CM

## 2024-10-31 DIAGNOSIS — Z79.890 CURRENT LONG-TERM USE OF POSTMENOPAUSAL HORMONE REPLACEMENT THERAPY: ICD-10-CM

## 2024-10-31 DIAGNOSIS — G43.109 MIGRAINE WITH AURA AND WITHOUT STATUS MIGRAINOSUS, NOT INTRACTABLE: ICD-10-CM

## 2024-10-31 DIAGNOSIS — Z79.890 CURRENT LONG-TERM USE OF POSTMENOPAUSAL HORMONE REPLACEMENT THERAPY: Primary | ICD-10-CM

## 2024-10-31 LAB
ALBUMIN SERPL-MCNC: 4.2 G/DL (ref 3.5–5)
ALBUMIN/GLOB SERPL: 1.5 (ref 1–1.9)
ALP SERPL-CCNC: 48 U/L (ref 35–104)
ALT SERPL-CCNC: 14 U/L (ref 8–45)
ANION GAP SERPL CALC-SCNC: 10 MMOL/L (ref 7–16)
AST SERPL-CCNC: 21 U/L (ref 15–37)
BASOPHILS # BLD: 0.1 K/UL (ref 0–0.2)
BASOPHILS NFR BLD: 1 % (ref 0–2)
BILIRUB SERPL-MCNC: 0.8 MG/DL (ref 0–1.2)
BUN SERPL-MCNC: 10 MG/DL (ref 6–23)
CALCIUM SERPL-MCNC: 9.5 MG/DL (ref 8.8–10.2)
CHLORIDE SERPL-SCNC: 105 MMOL/L (ref 98–107)
CHOLEST SERPL-MCNC: 189 MG/DL (ref 0–200)
CO2 SERPL-SCNC: 26 MMOL/L (ref 20–29)
CREAT SERPL-MCNC: 0.85 MG/DL (ref 0.6–1.1)
DIFFERENTIAL METHOD BLD: NORMAL
EOSINOPHIL # BLD: 0.2 K/UL (ref 0–0.8)
EOSINOPHIL NFR BLD: 4 % (ref 0.5–7.8)
ERYTHROCYTE [DISTWIDTH] IN BLOOD BY AUTOMATED COUNT: 12.2 % (ref 11.9–14.6)
ESTRADIOL SERPL-MCNC: 130 PG/ML
FSH SERPL-ACNC: 36.8 MIU/ML
GLOBULIN SER CALC-MCNC: 2.7 G/DL (ref 2.3–3.5)
GLUCOSE SERPL-MCNC: 88 MG/DL (ref 70–99)
HCT VFR BLD AUTO: 40.5 % (ref 35.8–46.3)
HDLC SERPL-MCNC: 83 MG/DL (ref 40–60)
HDLC SERPL: 2.3 (ref 0–5)
HGB BLD-MCNC: 13.3 G/DL (ref 11.7–15.4)
IMM GRANULOCYTES # BLD AUTO: 0 K/UL (ref 0–0.5)
IMM GRANULOCYTES NFR BLD AUTO: 0 % (ref 0–5)
LDLC SERPL CALC-MCNC: 94 MG/DL (ref 0–100)
LYMPHOCYTES # BLD: 1.4 K/UL (ref 0.5–4.6)
LYMPHOCYTES NFR BLD: 33 % (ref 13–44)
MCH RBC QN AUTO: 30.6 PG (ref 26.1–32.9)
MCHC RBC AUTO-ENTMCNC: 32.8 G/DL (ref 31.4–35)
MCV RBC AUTO: 93.3 FL (ref 82–102)
MONOCYTES # BLD: 0.4 K/UL (ref 0.1–1.3)
MONOCYTES NFR BLD: 10 % (ref 4–12)
NEUTS SEG # BLD: 2.2 K/UL (ref 1.7–8.2)
NEUTS SEG NFR BLD: 52 % (ref 43–78)
NRBC # BLD: 0 K/UL (ref 0–0.2)
PLATELET # BLD AUTO: 227 K/UL (ref 150–450)
PMV BLD AUTO: 10.7 FL (ref 9.4–12.3)
POTASSIUM SERPL-SCNC: 4.7 MMOL/L (ref 3.5–5.1)
PROGEST SERPL-MCNC: 1.86 NG/ML
PROT SERPL-MCNC: 6.9 G/DL (ref 6.3–8.2)
RBC # BLD AUTO: 4.34 M/UL (ref 4.05–5.2)
SODIUM SERPL-SCNC: 141 MMOL/L (ref 136–145)
TRIGL SERPL-MCNC: 60 MG/DL (ref 0–150)
VLDLC SERPL CALC-MCNC: 12 MG/DL (ref 6–23)
WBC # BLD AUTO: 4.3 K/UL (ref 4.3–11.1)

## 2024-10-31 RX ORDER — ESTERIFIED ESTROGEN AND METHYLTESTOSTERONE .625; 1.25 MG/1; MG/1
TABLET ORAL
Qty: 63 TABLET | Refills: 1 | Status: SHIPPED | OUTPATIENT
Start: 2024-10-31

## 2024-10-31 RX ORDER — PROGESTERONE 100 MG/1
100 CAPSULE ORAL NIGHTLY
Qty: 90 CAPSULE | Refills: 1 | Status: SHIPPED | OUTPATIENT
Start: 2024-10-31

## 2024-10-31 ASSESSMENT — ENCOUNTER SYMPTOMS
COUGH: 0
DIARRHEA: 0
CONSTIPATION: 0
SHORTNESS OF BREATH: 0
VOMITING: 0

## 2024-10-31 NOTE — PROGRESS NOTES
Maeve Hodgson (:  1975) is a 49 y.o. female,Established patient, here for evaluation of the following chief complaint(s):  6 Month Follow-Up (On Migraines and Menopause.)      Assessment & Plan   1. Current long-term use of postmenopausal hormone replacement therapy  -     Comprehensive Metabolic Panel; Future  -     Lipid Panel; Future  2. Migraine with aura and without status migrainosus, not intractable  -     Ubrogepant 100 MG TABS; Take 100 mg by mouth as needed (Migraines), Disp-16 tablet, R-5Normal  -     CBC with Auto Differential; Future  3. Menopausal symptoms  -     estrogens, conjugated,-methylTESTOSTERone (ESTRATEST HS) 0.625-1.25 MG per tablet; Take 1 tab daily for 3 weeks, then stop for 1 week, then resume., Disp-63 tablet, R-1Normal  -     progesterone (PROMETRIUM) 100 MG CAPS capsule; Take 1 capsule by mouth nightly, Disp-90 capsule, R-1Normal  -     Testosterone, free, total; Future  -     Estradiol; Future  -     Progesterone; Future  -     Follicle Stimulating Hormone; Future  4. Screening for colon cancer  -     [facility]enter[facility]    No follow-ups on file.       Subjective   HPI  Chief Complaint   Patient presents with    6 Month Follow-Up     On Migraines and Menopause.   Migraines well controlled on ubrelvy. Doesn't need refills. Ubrelvy works when she gets one.  Getting around 1-2 migraines a month. Thinks fall is worse and so more headaches during this season, but ubrelvy has helped almost every time.     She is on estratest and prometrium for hrt and is doing well with this. No side effects.  Improvement in sex life, less hot flashes, and less brain fog. No more periods in over 6 months.       Review of Systems   Constitutional:  Negative for diaphoresis and unexpected weight change.   HENT:  Negative for congestion.    Eyes:  Negative for visual disturbance.   Respiratory:  Negative for cough and shortness of breath.    Cardiovascular:   - It looks like unfortunately Dr. Destinee Zelaya is not in network for your insurance. - Start tramadol for back pain. Take 1-2 times daily as needed  - Follow up with Dr. Ann Cespedes, chiropractor, for back pain  [address],   [place], [address] [facility]  Phone: [phone]  - Follow up in 3 months for chronic issues/Medicare Wellness visit; follow up earlier as needed. It was a pleasure seeing you in the clinic today. Thank you for choosing the [facility] office for your healthcare needs. Please call at [phone] with any questions or concerns.     Meredith Cooper MD

## 2024-11-04 LAB
TESTOST FREE SERPL-MCNC: <0.2 PG/ML (ref 0–4.2)
TESTOST SERPL-MCNC: <3 NG/DL (ref 4–50)

## 2024-11-15 ENCOUNTER — PREP FOR PROCEDURE (OUTPATIENT)
Dept: GASTROENTEROLOGY | Age: 49
End: 2024-11-15

## 2024-11-15 DIAGNOSIS — Z12.11 ENCOUNTER FOR SCREENING COLONOSCOPY: ICD-10-CM

## 2024-11-18 RX ORDER — SODIUM CHLORIDE 9 MG/ML
25 INJECTION, SOLUTION INTRAVENOUS PRN
Status: CANCELLED | OUTPATIENT
Start: 2024-11-18

## 2024-11-18 RX ORDER — SODIUM CHLORIDE 0.9 % (FLUSH) 0.9 %
5-40 SYRINGE (ML) INJECTION PRN
Status: CANCELLED | OUTPATIENT
Start: 2024-11-18

## 2024-11-18 RX ORDER — SODIUM CHLORIDE 0.9 % (FLUSH) 0.9 %
5-40 SYRINGE (ML) INJECTION EVERY 12 HOURS SCHEDULED
Status: CANCELLED | OUTPATIENT
Start: 2024-11-18

## 2024-11-26 NOTE — PROGRESS NOTES
Patient verified name, , and procedure.    Type: 1A; abbreviated assessment per anesthesia guidelines.    Labs per anesthesia: None.      Instructed pt that they will be notified the day before their procedure by the GI Lab for time of arrival if their procedure is [place] and Pre-op for [place] cases. Arrival times should be called by 5 pm. If no phone is received the patient should contact their respective hospital. The GI lab telephone number is [phone] and ES Pre-op is [phone].     Follow diet and prep instructions per office, including NPO status.      Bath or shower the night before and the am of surgery with non-moisturizing soap. No lotions, oils, powders, perfumes, or cologne on skin. No make up, eye make up or jewelry. Wear loose fitting comfortable, clean clothing.     Must have adult present in building the entire time .     Medications to take on the day of procedure: None, per anesthesia guidelines.    Medications to hold: None, per anesthesia guidelines.     Patient instructed to hold all vitamins/supplements 7 days prior to surgery and NSAIDS 5 days prior to surgery. Verbalized understanding.     Pt requested to drink 32 ounces of non-caffeinated clear liquids 2 hours prior to their arrival to avoid dehydration, per anesthesia recommendation.

## 2024-12-05 ENCOUNTER — ANESTHESIA EVENT (OUTPATIENT)
Dept: ENDOSCOPY | Age: 49
End: 2024-12-05
Payer: OTHER GOVERNMENT

## 2024-12-05 RX ORDER — NALOXONE HYDROCHLORIDE 0.4 MG/ML
INJECTION, SOLUTION INTRAMUSCULAR; INTRAVENOUS; SUBCUTANEOUS PRN
Status: CANCELLED | OUTPATIENT
Start: 2024-12-05

## 2024-12-05 NOTE — PROGRESS NOTES
Arrival time of 1050, location and  requirements given  instructed to drink 32 oz of water 2 hours prior to arrival time

## 2024-12-06 ENCOUNTER — HOSPITAL ENCOUNTER (OUTPATIENT)
Age: 49
Discharge: HOME | End: 2024-12-06
Attending: STUDENT IN AN ORGANIZED HEALTH CARE EDUCATION/TRAINING PROGRAM | Admitting: STUDENT IN AN ORGANIZED HEALTH CARE EDUCATION/TRAINING PROGRAM
Payer: OTHER GOVERNMENT

## 2024-12-06 ENCOUNTER — ANESTHESIA (OUTPATIENT)
Dept: ENDOSCOPY | Age: 49
End: 2024-12-06
Payer: OTHER GOVERNMENT

## 2024-12-06 VITALS
HEIGHT: 64 IN | SYSTOLIC BLOOD PRESSURE: 117 MMHG | HEART RATE: 84 BPM | OXYGEN SATURATION: 98 % | DIASTOLIC BLOOD PRESSURE: 66 MMHG | BODY MASS INDEX: 21.34 KG/M2 | RESPIRATION RATE: 14 BRPM | WEIGHT: 125 LBS | TEMPERATURE: 96.8 F

## 2024-12-06 PROCEDURE — 88305 TISSUE EXAM BY PATHOLOGIST: CPT

## 2024-12-06 PROCEDURE — 7100000010 HC PHASE II RECOVERY - FIRST 15 MIN: Performed by: STUDENT IN AN ORGANIZED HEALTH CARE EDUCATION/TRAINING PROGRAM

## 2024-12-06 PROCEDURE — 3609010600 HC COLONOSCOPY POLYPECTOMY SNARE/COLD BIOPSY: Performed by: STUDENT IN AN ORGANIZED HEALTH CARE EDUCATION/TRAINING PROGRAM

## 2024-12-06 PROCEDURE — 3700000000 HC ANESTHESIA ATTENDED CARE: Performed by: STUDENT IN AN ORGANIZED HEALTH CARE EDUCATION/TRAINING PROGRAM

## 2024-12-06 PROCEDURE — 6360000002 HC RX W HCPCS: Performed by: REGISTERED NURSE

## 2024-12-06 PROCEDURE — 7100000011 HC PHASE II RECOVERY - ADDTL 15 MIN: Performed by: STUDENT IN AN ORGANIZED HEALTH CARE EDUCATION/TRAINING PROGRAM

## 2024-12-06 PROCEDURE — 3700000001 HC ADD 15 MINUTES (ANESTHESIA): Performed by: STUDENT IN AN ORGANIZED HEALTH CARE EDUCATION/TRAINING PROGRAM

## 2024-12-06 PROCEDURE — 2709999900 HC NON-CHARGEABLE SUPPLY: Performed by: STUDENT IN AN ORGANIZED HEALTH CARE EDUCATION/TRAINING PROGRAM

## 2024-12-06 RX ORDER — LIDOCAINE HYDROCHLORIDE 10 MG/ML
1 INJECTION, SOLUTION INFILTRATION; PERINEURAL
Status: DISCONTINUED | OUTPATIENT
Start: 2024-12-06 | End: 2024-12-06 | Stop reason: HOSPADM

## 2024-12-06 RX ORDER — PROPOFOL 10 MG/ML
INJECTION, EMULSION INTRAVENOUS
Status: DISCONTINUED | OUTPATIENT
Start: 2024-12-06 | End: 2024-12-06 | Stop reason: SDUPTHER

## 2024-12-06 RX ORDER — LIDOCAINE HYDROCHLORIDE 20 MG/ML
INJECTION, SOLUTION EPIDURAL; INFILTRATION; INTRACAUDAL; PERINEURAL
Status: DISCONTINUED | OUTPATIENT
Start: 2024-12-06 | End: 2024-12-06 | Stop reason: SDUPTHER

## 2024-12-06 RX ORDER — SODIUM CHLORIDE 0.9 % (FLUSH) 0.9 %
5-40 SYRINGE (ML) INJECTION PRN
Status: DISCONTINUED | OUTPATIENT
Start: 2024-12-06 | End: 2024-12-06 | Stop reason: HOSPADM

## 2024-12-06 RX ORDER — SODIUM CHLORIDE 9 MG/ML
25 INJECTION, SOLUTION INTRAVENOUS PRN
Status: DISCONTINUED | OUTPATIENT
Start: 2024-12-06 | End: 2024-12-06 | Stop reason: HOSPADM

## 2024-12-06 RX ORDER — SODIUM CHLORIDE 9 MG/ML
INJECTION, SOLUTION INTRAVENOUS PRN
Status: DISCONTINUED | OUTPATIENT
Start: 2024-12-06 | End: 2024-12-06 | Stop reason: HOSPADM

## 2024-12-06 RX ORDER — SODIUM CHLORIDE 0.9 % (FLUSH) 0.9 %
5-40 SYRINGE (ML) INJECTION EVERY 12 HOURS SCHEDULED
Status: DISCONTINUED | OUTPATIENT
Start: 2024-12-06 | End: 2024-12-06 | Stop reason: HOSPADM

## 2024-12-06 RX ORDER — SODIUM CHLORIDE, SODIUM LACTATE, POTASSIUM CHLORIDE, CALCIUM CHLORIDE 600; 310; 30; 20 MG/100ML; MG/100ML; MG/100ML; MG/100ML
INJECTION, SOLUTION INTRAVENOUS CONTINUOUS
Status: DISCONTINUED | OUTPATIENT
Start: 2024-12-06 | End: 2024-12-06 | Stop reason: HOSPADM

## 2024-12-06 RX ADMIN — LIDOCAINE HYDROCHLORIDE 50 MG: 20 INJECTION, SOLUTION EPIDURAL; INFILTRATION; INTRACAUDAL; PERINEURAL at 12:27

## 2024-12-06 RX ADMIN — PROPOFOL 40 MG: 10 INJECTION, EMULSION INTRAVENOUS at 12:27

## 2024-12-06 RX ADMIN — PROPOFOL 180 MCG/KG/MIN: 10 INJECTION, EMULSION INTRAVENOUS at 12:28

## 2024-12-06 ASSESSMENT — PAIN - FUNCTIONAL ASSESSMENT
PAIN_FUNCTIONAL_ASSESSMENT: NONE - DENIES PAIN

## 2024-12-06 NOTE — H&P
Maeve Hodgson is 49 y.o. y/o female here for screening colonoscopy.    No immediate (parents/siblings) FH of colon cancer, no acute symptoms.     Past Medical History:   Diagnosis Date    ASCUS with positive high risk HPV cervical 06/20/2018    Headache     Hepatitis A     HPV (human papilloma virus) infection 2017, 2018 and 2019    Miscarriage     MAB     Past Surgical History:   Procedure Laterality Date    COLPOSCOPY  02/18/2017    HPV Effect    DILATION AND CURETTAGE OF UTERUS      MAB    LAPAROSCOPY      WISDOM TOOTH EXTRACTION  1994     Family History   Problem Relation Age of Onset    Hypertension Mother     Rheum Arthritis Mother     No Known Problems Father     Alzheimer's Disease Maternal Grandfather     Suicide Paternal Grandmother     Heart Disease Paternal Grandfather     Alzheimer's Disease Maternal Grandmother     Breast Cancer Neg Hx      Social History     Tobacco Use    Smoking status: Never    Smokeless tobacco: Never   Substance Use Topics    Alcohol use: Yes     Alcohol/week: 4.0 standard drinks of alcohol     Types: 3 Glasses of wine, 1 Drinks containing 0.5 oz of alcohol per week    Drug use: No     Allergies   Allergen Reactions    Terbutaline Anaphylaxis     Current Outpatient Medications   Medication Instructions    estrogens, conjugated,-methylTESTOSTERone (ESTRATEST HS) 0.625-1.25 MG per tablet Take 1 tab daily for 3 weeks, then stop for 1 week, then resume.    Fexofenadine HCl (ALLEGRA ALLERGY PO) 1 tablet, Oral, DAILY    progesterone (PROMETRIUM) 100 mg, Oral, NIGHTLY    Ubrogepant 100 mg, Oral, AS NEEDED     Review of Systems    ROS:    A complete 11 system ROS was performed and was negative aside from the pertinent negative and positives noted above.     PE:   There were no vitals filed for this visit.   General:  The patient appears well-nourished, and is in no acute distress.    Skin:  no rash, ulcers. No Bleeding or signs of infection.  HEENT:  Normocephalic, atraumatic. No  sclerae icterus.   Neck:  No pain on palpation or mobilization of the neck.  Respiratory: Respiratory effort is normal. Expansion maintained bilaterally and symmetrically. Normal breath sounds and clear to auscultation bilaterally without wheezes, rales, or rhonchi.    Cardiovascular:  Regular rate and rhythm.     Abdomen:  Soft, non tender to palpation. No distention. Normoactive bowel sounds present.    Extremities: No edema bilaterally. No erythema  Neurologic:  Alert and oriented x3.  No sensory deficits. No asterixis  Psychiatric: Appropriate mood and affect.  Musculoskeletal: Strength and tone are symmetrical and maintained.    Shonda Spangler MD  [facility]

## 2024-12-06 NOTE — ANESTHESIA POSTPROCEDURE EVALUATION
Department of Anesthesiology  Postprocedure Note    Patient: Maeve Hodgson  MRN: [id number]  YOB: 1975  Date of evaluation: 12/6/2024    Procedure Summary       Date: 12/06/24 Room / Location: [facility] ENDO 04 / [facility] ENDOSCOPY    Anesthesia Start: 1222 Anesthesia Stop: 1246    Procedure: COLONOSCOPY POLYPECTOMY SNARE/BIOPSY Diagnosis:       Encounter for screening colonoscopy      (Encounter for screening colonoscopy [Z12.11])    Surgeons: Shonda Spangler MD Responsible Provider: Real Bullard MD    Anesthesia Type: TIVA ASA Status: 1            Anesthesia Type: No value filed.    Luz Phase I: Luz Score: 10    Luz Phase II: Luz Score: 10    Anesthesia Post Evaluation    Patient location during evaluation: PACU  Patient participation: complete - patient participated  Level of consciousness: awake  Airway patency: patent  Nausea & Vomiting: no nausea and no vomiting  Cardiovascular status: blood pressure returned to baseline  Respiratory status: acceptable  Hydration status: euvolemic  Comments: --------------------            12/06/24               1327     --------------------   BP:       117/66     Pulse:      84       Resp:                Temp:                SpO2:      98%      --------------------    Pain management: adequate    No notable events documented.

## 2024-12-06 NOTE — ANESTHESIA PRE PROCEDURE
Department of Anesthesiology  Preprocedure Note       Name:  Maeve Hodgson   Age:  49 y.o.  :  1975                                          MRN:  [id number]         Date:  2024      Surgeon: Surgeon(s):  Shonda Spangler MD    Procedure: Procedure(s):  COLORECTAL CANCER SCREENING, NOT HIGH RISK    Medications prior to admission:   Prior to Admission medications    Medication Sig Start Date End Date Taking? Authorizing Provider   Fexofenadine HCl (ALLEGRA ALLERGY PO) Take 1 tablet by mouth daily   Yes Ana Luisa Arredondo MD   Ubrogepant 100 MG TABS Take 100 mg by mouth as needed (Migraines) 10/31/24  Yes Lisbeth Ellington MD   estrogens, conjugated,-methylTESTOSTERone (ESTRATEST HS) 0.625-1.25 MG per tablet Take 1 tab daily for 3 weeks, then stop for 1 week, then resume. 10/31/24  Yes Lisbeth Ellington MD   progesterone (PROMETRIUM) 100 MG CAPS capsule Take 1 capsule by mouth nightly 10/31/24  Yes Lisbeth Ellington MD       Current medications:    Current Facility-Administered Medications   Medication Dose Route Frequency Provider Last Rate Last Admin   • lidocaine 1 % injection 1 mL  1 mL IntraDERmal Once PRN Real Bullard MD       • lactated ringers infusion   IntraVENous Continuous Real Bullard MD       • sodium chloride flush 0.9 % injection 5-40 mL  5-40 mL IntraVENous 2 times per day Real Bullard MD       • sodium chloride flush 0.9 % injection 5-40 mL  5-40 mL IntraVENous PRN Real Bullard MD       • 0.9 % sodium chloride infusion   IntraVENous PRN Real Bullard MD       • sodium chloride flush 0.9 % injection 5-40 mL  5-40 mL IntraVENous 2 times per day Shonda Spangler MD       • sodium chloride flush 0.9 % injection 5-40 mL  5-40 mL IntraVENous PRN Shonda Spangler MD       • 0.9 % sodium chloride infusion  25 mL IntraVENous PRN Shonda Spangler MD           Allergies:    Allergies   Allergen Reactions   • Terbutaline Anaphylaxis       Problem List:

## 2024-12-06 NOTE — DISCHARGE INSTRUCTIONS
-Gastrointestinal Colonoscopy/Flexible Sigmoidoscopy - Lower Exam Discharge Instructions  Call Dr. Spangler  [phone] for any problems or questions.  Contact the doctor’s office for follow up appointment as directed  Medication may cause drowsiness for several hours, therefore, do not drive or operate machinery for remainder of the day.  No alcohol today.  Do not make any important decisions such signing legal paperwork.  Ordinarily, you may resume regular diet and activity after exam unless otherwise specified by your physician.  Because of air put into your colon during exam, you may experience some abdominal distension, relieved by the passage of gas, for several hours.  Contact your physician if you have any of the following:  Excessive amount of bleeding - large amount when having a bowel movement.  Occasional specks of blood with bowel movement would not be unusual.  Severe abdominal pain  Fever or Chills  Polyp Removal - follow these additional instructions  Clear liquid diet for the next meal (jell-o, broth, clear drinks)  Soft diet for 24 hours, then resume regular diet   Take Metamucil - 1 tablespoon in juice every morning for 3 days, if needed.  No Aspirin, Advil, Aleve, Nuprin, Ibuprofen, or medications that contain these drugs for 2 weeks, unless taken for a heart condition.    Any additional instructions:   ***        Instructions given to Maeve Hodgson and other family members.

## 2024-12-15 PROBLEM — Z12.11 ENCOUNTER FOR SCREENING COLONOSCOPY: Status: RESOLVED | Noted: 2024-11-15 | Resolved: 2024-12-15

## 2025-01-30 DIAGNOSIS — N95.1 MENOPAUSAL SYMPTOMS: ICD-10-CM

## 2025-01-30 NOTE — TELEPHONE ENCOUNTER
Last visit was on 10-31-24, and next visit is on 10-30-25 to est with NP Rose. [facility] states it was last filled on 12-4-24.

## 2025-01-31 RX ORDER — ESTERIFIED ESTROGEN AND METHYLTESTOSTERONE .625; 1.25 MG/1; MG/1
TABLET ORAL
Qty: 63 TABLET | Refills: 1 | Status: SHIPPED | OUTPATIENT
Start: 2025-01-31

## 2025-05-08 DIAGNOSIS — N95.1 MENOPAUSAL SYMPTOMS: ICD-10-CM

## 2025-05-08 RX ORDER — PROGESTERONE 100 MG/1
100 CAPSULE ORAL NIGHTLY
Qty: 90 CAPSULE | Refills: 1 | Status: SHIPPED | OUTPATIENT
Start: 2025-05-08

## 2025-08-31 DIAGNOSIS — N95.1 MENOPAUSAL SYMPTOMS: ICD-10-CM

## 2025-09-02 RX ORDER — ESTERIFIED ESTROGEN AND METHYLTESTOSTERONE .625; 1.25 MG/1; MG/1
TABLET ORAL
Qty: 63 TABLET | Refills: 0 | Status: SHIPPED | OUTPATIENT
Start: 2025-09-02

## (undated) DEVICE — NEEDLE SYRINGE 18GA L1.5IN RED PLAS HUB S STL BLNT FILL W/O

## (undated) DEVICE — SYRINGE MED 10ML LUERLOCK TIP W/O SFTY DISP

## (undated) DEVICE — FORCEPS BX L240CM JAW DIA2.8MM L CAP W/ NDL MIC MESH TOOTH

## (undated) DEVICE — CONNECTOR TBNG OD5-7MM O2 END DISP

## (undated) DEVICE — LUBE JELLY FOIL PACK 1.4 OZ: Brand: MEDLINE INDUSTRIES, INC.

## (undated) DEVICE — SYRINGE MEDICAL 3ML CLEAR PLASTIC STANDARD NON CONTROL LUERLOCK TIP DISPOSABLE

## (undated) DEVICE — CONTAINER FORMALIN PREFILLED 10% NBF 60ML

## (undated) DEVICE — SINGLE PORT MANIFOLD: Brand: NEPTUNE 2

## (undated) DEVICE — KENDALL RADIOLUCENT FOAM MONITORING ELECTRODE RECTANGULAR SHAPE: Brand: KENDALL

## (undated) DEVICE — GAUZE,SPONGE,4"X4",12PLY,WOVEN,NS,LF: Brand: MEDLINE

## (undated) DEVICE — AIRLIFE™ OXYGEN TUBING 7 FEET (2.1 M) CRUSH RESISTANT OXYGEN TUBING, VINYL TIPPED: Brand: AIRLIFE™

## (undated) DEVICE — ENDOSCOPIC KIT 1.1+ OP4 CA DE 2 GWN AAMI LEVEL 3